# Patient Record
Sex: MALE | Race: BLACK OR AFRICAN AMERICAN | NOT HISPANIC OR LATINO | Employment: FULL TIME | URBAN - METROPOLITAN AREA
[De-identification: names, ages, dates, MRNs, and addresses within clinical notes are randomized per-mention and may not be internally consistent; named-entity substitution may affect disease eponyms.]

---

## 2024-07-03 ENCOUNTER — APPOINTMENT (OUTPATIENT)
Dept: PRIMARY CARE | Facility: CLINIC | Age: 69
End: 2024-07-03

## 2024-07-03 ENCOUNTER — TELEPHONE (OUTPATIENT)
Dept: PRIMARY CARE | Facility: CLINIC | Age: 69
End: 2024-07-03

## 2024-07-03 VITALS
DIASTOLIC BLOOD PRESSURE: 65 MMHG | HEIGHT: 73 IN | HEART RATE: 61 BPM | WEIGHT: 150 LBS | SYSTOLIC BLOOD PRESSURE: 137 MMHG | BODY MASS INDEX: 19.88 KG/M2

## 2024-07-03 DIAGNOSIS — E78.5 HYPERLIPIDEMIA, UNSPECIFIED HYPERLIPIDEMIA TYPE: ICD-10-CM

## 2024-07-03 DIAGNOSIS — Z78.9 PESCETARIAN: ICD-10-CM

## 2024-07-03 DIAGNOSIS — Z00.00 ENCOUNTER FOR MEDICARE ANNUAL WELLNESS EXAM: Primary | ICD-10-CM

## 2024-07-03 DIAGNOSIS — Z12.5 SCREENING FOR PROSTATE CANCER: ICD-10-CM

## 2024-07-03 PROBLEM — R03.0 FINDING OF ABOVE NORMAL BLOOD PRESSURE: Status: RESOLVED | Noted: 2024-07-03 | Resolved: 2024-07-03

## 2024-07-03 PROBLEM — R03.0 FINDING OF ABOVE NORMAL BLOOD PRESSURE: Status: ACTIVE | Noted: 2024-07-03

## 2024-07-03 PROBLEM — M25.461 EFFUSION OF RIGHT KNEE: Status: ACTIVE | Noted: 2024-07-03

## 2024-07-03 PROCEDURE — 80061 LIPID PANEL: CPT

## 2024-07-03 PROCEDURE — 1124F ACP DISCUSS-NO DSCNMKR DOCD: CPT | Performed by: NURSE PRACTITIONER

## 2024-07-03 PROCEDURE — 80053 COMPREHEN METABOLIC PANEL: CPT

## 2024-07-03 PROCEDURE — 82607 VITAMIN B-12: CPT

## 2024-07-03 PROCEDURE — 1036F TOBACCO NON-USER: CPT | Performed by: NURSE PRACTITIONER

## 2024-07-03 PROCEDURE — 36415 COLL VENOUS BLD VENIPUNCTURE: CPT

## 2024-07-03 PROCEDURE — 1170F FXNL STATUS ASSESSED: CPT | Performed by: NURSE PRACTITIONER

## 2024-07-03 PROCEDURE — 1159F MED LIST DOCD IN RCRD: CPT | Performed by: NURSE PRACTITIONER

## 2024-07-03 PROCEDURE — G0103 PSA SCREENING: HCPCS

## 2024-07-03 PROCEDURE — 1160F RVW MEDS BY RX/DR IN RCRD: CPT | Performed by: NURSE PRACTITIONER

## 2024-07-03 PROCEDURE — G0439 PPPS, SUBSEQ VISIT: HCPCS | Performed by: NURSE PRACTITIONER

## 2024-07-03 ASSESSMENT — ACTIVITIES OF DAILY LIVING (ADL)
DRESSING: INDEPENDENT
MANAGING_FINANCES: INDEPENDENT
DOING_HOUSEWORK: INDEPENDENT
BATHING: INDEPENDENT
GROCERY_SHOPPING: INDEPENDENT
TAKING_MEDICATION: INDEPENDENT

## 2024-07-03 ASSESSMENT — ENCOUNTER SYMPTOMS
LOSS OF SENSATION IN FEET: 0
OCCASIONAL FEELINGS OF UNSTEADINESS: 0
DEPRESSION: 0

## 2024-07-03 ASSESSMENT — PATIENT HEALTH QUESTIONNAIRE - PHQ9
2. FEELING DOWN, DEPRESSED OR HOPELESS: NOT AT ALL
SUM OF ALL RESPONSES TO PHQ9 QUESTIONS 1 AND 2: 0
1. LITTLE INTEREST OR PLEASURE IN DOING THINGS: NOT AT ALL

## 2024-07-03 NOTE — PATIENT INSTRUCTIONS
Vaccines you are due for: Tdap, COVID, RSV, pneumonia, shingles -- you will need to get these at your pharmacy  Please bring in a copy or send a message with your colon cancer screening results

## 2024-07-03 NOTE — PROGRESS NOTES
"Subjective   Reason for Visit: Virgilio Leggett is an 69 y.o. male here for a Medicare Wellness visit.     Past Medical, Surgical, and Family History reviewed and updated in chart.    Reviewed all medications by prescribing practitioner or clinical pharmacist (such as prescriptions, OTCs, herbal therapies and supplements) and documented in the medical record.    [facility]    Patient Care Team:  Arash Boss DO as PCP - General (Sports Medicine)  Julio Mcguire DO as PCP - [facility] [employer] PCP     Review of Systems  Gen: denies fever, chills, weight loss, fatigue  HEENT: denies sinus pressure, sinus congestion, runny nose, red eyes, itchy eyes, vision loss, ear pain, hearing loss, throat pain, trouble swallowing  Neck: denies neck pain, neck swelling or masses  CV: denies chest pain, palpitations, fast heart rate, syncope  Resp: denies shortness of breath, cough, wheezing  GI: denies abdominal pain, nausea, diarrhea, constipation, hematochezia, melena  : denies dysuria, hematuria, penile discharge, frequency  Endo: denies polydipsia, polyuria, heat/cold intolerance, weight change, hair thinning  Heme: denies easy bruising, easy bleeding  Neuro: denies headache, numbness, tingling, memory loss, changes in vision  MSK: denies joint pain, joint swelling, weakness  Psych: denies suicidal ideation, homicidal ideation, depression, anxiety, mood swings  Skin: denies rashes, abnormal lesions, itching, changes in moles    Objective   Vitals:  /65   Pulse 61   Ht 1.854 m (6' 1\")   Wt 68 kg (150 lb)   BMI 19.79 kg/m²       Physical Exam  No physical exam was completed today.     Assessment/Plan   Medicare Wellness Exam  - Vaccines due: Tdap, COVID, RSV, pneumonia, shingles --> directed to pharmacy for these  - CRC screening: States he did a FIT test last month through insurance, will send copy   - Labs: lipid panel, CMP, vit B12, PSA  - Declined STI screening  - Maintain healthy lifestyle    RTC in 1 " year for Medicare Wellness Exam or sooner prn    Laverne Gambino, APRN-CNP  [facility]

## 2024-07-04 LAB
ALBUMIN SERPL BCP-MCNC: 4.2 G/DL (ref 3.4–5)
ALP SERPL-CCNC: 58 U/L (ref 33–136)
ALT SERPL W P-5'-P-CCNC: 21 U/L (ref 10–52)
ANION GAP SERPL CALC-SCNC: 14 MMOL/L (ref 10–20)
AST SERPL W P-5'-P-CCNC: 26 U/L (ref 9–39)
BILIRUB SERPL-MCNC: 0.5 MG/DL (ref 0–1.2)
BUN SERPL-MCNC: 8 MG/DL (ref 6–23)
CALCIUM SERPL-MCNC: 9.3 MG/DL (ref 8.6–10.6)
CHLORIDE SERPL-SCNC: 103 MMOL/L (ref 98–107)
CHOLEST SERPL-MCNC: 189 MG/DL (ref 0–199)
CHOLESTEROL/HDL RATIO: 2.9
CO2 SERPL-SCNC: 28 MMOL/L (ref 21–32)
CREAT SERPL-MCNC: 0.91 MG/DL (ref 0.5–1.3)
EGFRCR SERPLBLD CKD-EPI 2021: 90 ML/MIN/1.73M*2
GLUCOSE SERPL-MCNC: 83 MG/DL (ref 74–99)
HDLC SERPL-MCNC: 65.2 MG/DL
LDLC SERPL CALC-MCNC: 111 MG/DL
NON HDL CHOLESTEROL: 124 MG/DL (ref 0–149)
POTASSIUM SERPL-SCNC: 4 MMOL/L (ref 3.5–5.3)
PROT SERPL-MCNC: 6.5 G/DL (ref 6.4–8.2)
PSA SERPL-MCNC: 1.36 NG/ML
SODIUM SERPL-SCNC: 141 MMOL/L (ref 136–145)
TRIGL SERPL-MCNC: 66 MG/DL (ref 0–149)
VIT B12 SERPL-MCNC: 632 PG/ML (ref 211–911)
VLDL: 13 MG/DL (ref 0–40)

## 2024-08-22 ENCOUNTER — TELEPHONE (OUTPATIENT)
Dept: PRIMARY CARE | Facility: CLINIC | Age: 69
End: 2024-08-22
Payer: COMMERCIAL

## 2025-07-08 ENCOUNTER — APPOINTMENT (OUTPATIENT)
Dept: PRIMARY CARE | Facility: CLINIC | Age: 70
End: 2025-07-08
Payer: COMMERCIAL

## 2025-07-08 VITALS
RESPIRATION RATE: 18 BRPM | WEIGHT: 163.6 LBS | HEART RATE: 52 BPM | BODY MASS INDEX: 21.58 KG/M2 | OXYGEN SATURATION: 98 % | SYSTOLIC BLOOD PRESSURE: 146 MMHG | DIASTOLIC BLOOD PRESSURE: 77 MMHG

## 2025-07-08 DIAGNOSIS — M17.11 PRIMARY OSTEOARTHRITIS OF RIGHT KNEE: Primary | ICD-10-CM

## 2025-07-08 PROCEDURE — 1159F MED LIST DOCD IN RCRD: CPT | Performed by: FAMILY MEDICINE

## 2025-07-08 PROCEDURE — 99213 OFFICE O/P EST LOW 20 MIN: CPT | Performed by: FAMILY MEDICINE

## 2025-07-08 NOTE — PROGRESS NOTES
Chief Complaint:  Medicare Annual Wellness Visit Subsequent (Would like his medicare wellness visit but his left knee has been bothering him)  History Of Present Illness:   Virgilio Leggett is a 70 y.o. male       7/8/25: not MWV. Problem focused for right knee.        Patient reports he moved to [place] in May from [place]. In 2011, he reports he was in [facility] when he fainted in the shower and bent his right knee as he fell. Patient reports that ever since, he has had decreased range of motion. He also has pain with range of motion. At the time of the injury, patient reports lots of knee swelling. Since then, he believes the right knee is more swollen than left but he does not notice any fluctuation in size. Stairs (up and down) makes the pain worse. Of note, patient also reports tearing a muscle on the L side of his back in 1978. Patient says this injury is not limiting to his daily activities but sometimes feels that.   - Jan 2023- right knee xrays- OA  - July 2025 update: still having right knee pain. May start using a cane soon. Agreed on handicap placard.          Healthcare team:  - JAHAIRA Gambino PCP. Pt will find new PCP.   - sports medicine- Karyn    PMH: none        Allergies: ASA    Surgical History: none        Tobacco  Packs per day/years/quit date- former, quit 1988.              Social History:  Living situation-   Work- [employer]   -     He is also a musician- keyboard.         Family History:  Cardiac- none  Cancer- none      Immunizations:  Patient is aware that they will need to go to local pharmacy to get immunizations that are not offered in clinic.                Review of Systems (BOLD if positive, delete if not asked):     Constitutional:   - fever   - chills   - night sweats  - unexpected weight change       Eyes:   - loss of vision  - double vision  - floaters     Ear/Nose/Throat/Mouth:   - hearing changes  - sore throat  - sinus congestion     Cardiovascular:   - chest  pain  - chest heaviness  - palpitations  - swelling in ankles       Respiratory:   - shortness of breath  - difficulty breathing  - frequent cough  - wheezing           Neurological:   - headache  - loss of consciousness  - tremors  - dizziness  - numbness   - tingling       Gastrointestinal:   - abdominal pain  - nausea  - vomiting  - constipation  - diarrhea  - bloody stools  - loss of bowel control  - heartburn       Genitourinary:   - urinary incontinence  - increased urinary frequency  - painful urination  - blood in urine          Psychological:   - feelings of depression  - feelings of anxiety          Psychological:   - feeling generally happy  - feeling safe at home          Last Recorded Vitals:  Vitals:    07/08/25 0900   BP: 146/77   BP Location: Left arm   Patient Position: Sitting   BP Cuff Size: Adult   Pulse: 52   Resp: 18   SpO2: 98%   Weight: 74.2 kg (163 lb 9.6 oz)        Past Medical History:  He has no past medical history on file.     Past Surgical History:  He has no past surgical history on file.     Social History:  He reports that he quit smoking about 53 years ago. His smoking use included cigarettes. He has never used smokeless tobacco. He reports current drug use. Frequency: 2.00 times per week. Drug: Marijuana. He reports that he does not drink alcohol.     Family History:  Family History[1]  Allergies:  Aspirin     Outpatient Medications:  No current outpatient medications     Physical Exam:  GENERAL: Well developed, well nourished, alert and cooperative, and appears to be in no acute distress.     PSYCH: mood pleasant and appropriate     HEAD: normocephalic     EYES: PERRL, EOMI. vision is grossly intact.        LUNGS: Good respiratory effort.    ABD: soft         GAIT: steady        EXTREMITIES: All 4 extremities are warm and well perfused.        NEUROLOGICAL:   CN II-XII grossly intact.               MUSCULOSKELETAL:  - right knee- medial joint line ttp, bony deformity. Remainder  "non-tender        Last Labs:  CBC -  No results found for: \"WBC\", \"HGB\", \"HCT\", \"MCV\", \"PLT\"     CMP -  Lab Results   Component Value Date    CALCIUM 9.3 07/03/2024    PROT 6.5 07/03/2024    ALBUMIN 4.2 07/03/2024    AST 26 07/03/2024    ALT 21 07/03/2024    ALKPHOS 58 07/03/2024    BILITOT 0.5 07/03/2024        LIPID PANEL -  Lab Results   Component Value Date    CHOL 189 07/03/2024    TRIG 66 07/03/2024    HDL 65.2 07/03/2024    CHHDL 2.9 07/03/2024    LDLF 112 (H) 01/23/2023    VLDL 13 07/03/2024    NHDL 124 07/03/2024           No results found for: \"BNP\", \"HGBA1C\"       KNEE  MRN: [id number]  Patient Name: JONAS LIVINGSTON     STUDY:  Right knee 3 views.     INDICATION:  right knee pain. injury 2011.  M25.461: Knee effusion, right.     COMPARISON:  None.     ACCESSION NUMBER(S):  [id number]     ORDERING CLINICIAN:  AUBRIE BENITEZ     FINDINGS:  No acute fracture or malalignment.  Sequela of remote healed fracture of proximal fibular diaphysis noted  with cortical thickening and irregularity.  Severe lateral and moderate medial/patellofemoral compartment  degenerative changes with joint space loss and osteophytes.  Small knee joint effusion.  Soft tissues are unremarkable.     IMPRESSION:  1. Severe lateral and moderate medial/osteoarthrosis.  2. Small joint effusion.         Assessment/Plan   Problem List Items Addressed This Visit    None  Visit Diagnoses         Codes      Primary osteoarthritis of right knee    -  Primary M17.11               Always good to see you for your knee.      No work note needed.      As we agreed to today, I do not complete disability paperwork, or act as the 'physician of record' in [facility] cases.      Referrals and advanced imaging scheduling line: [phone].  Another scheduling number is: [phone].  Another potential phone number is: [phone] ([phone]).  The number for pediatric referrals is: [phone].        We agreed today that you will make an appt with a " new PCP, and I'll happily continue any refills (as appropriate) until you establish with a new PCP.       Follow up as needed for knee.    Handicap placard x 1 year.         Arash Boss,        [1] No family history on file.